# Patient Record
Sex: MALE | Race: WHITE | NOT HISPANIC OR LATINO | Employment: FULL TIME | URBAN - METROPOLITAN AREA
[De-identification: names, ages, dates, MRNs, and addresses within clinical notes are randomized per-mention and may not be internally consistent; named-entity substitution may affect disease eponyms.]

---

## 2022-04-16 ENCOUNTER — APPOINTMENT (OUTPATIENT)
Dept: ULTRASOUND IMAGING | Facility: CLINIC | Age: 42
End: 2022-04-16
Attending: EMERGENCY MEDICINE
Payer: OTHER GOVERNMENT

## 2022-04-16 ENCOUNTER — HOSPITAL ENCOUNTER (EMERGENCY)
Facility: CLINIC | Age: 42
Discharge: HOME | End: 2022-04-16
Attending: EMERGENCY MEDICINE | Admitting: EMERGENCY MEDICINE
Payer: OTHER GOVERNMENT

## 2022-04-16 VITALS
TEMPERATURE: 98 F | HEART RATE: 64 BPM | OXYGEN SATURATION: 99 % | RESPIRATION RATE: 26 BRPM | DIASTOLIC BLOOD PRESSURE: 71 MMHG | SYSTOLIC BLOOD PRESSURE: 129 MMHG | WEIGHT: 268 LBS

## 2022-04-16 DIAGNOSIS — M79.89 PAIN AND SWELLING OF RIGHT LOWER LEG: ICD-10-CM

## 2022-04-16 DIAGNOSIS — M79.661 PAIN AND SWELLING OF RIGHT LOWER LEG: ICD-10-CM

## 2022-04-16 DIAGNOSIS — Z96.651 S/P TOTAL KNEE ARTHROPLASTY, RIGHT: ICD-10-CM

## 2022-04-16 PROCEDURE — 93971 EXTREMITY STUDY: CPT | Mod: RT

## 2022-04-16 PROCEDURE — 99284 EMERGENCY DEPT VISIT MOD MDM: CPT | Mod: 25

## 2022-04-16 PROCEDURE — 250N000013 HC RX MED GY IP 250 OP 250 PS 637: Performed by: EMERGENCY MEDICINE

## 2022-04-16 RX ORDER — OXYCODONE HYDROCHLORIDE 5 MG/1
5 TABLET ORAL ONCE
Status: COMPLETED | OUTPATIENT
Start: 2022-04-16 | End: 2022-04-16

## 2022-04-16 RX ADMIN — OXYCODONE HYDROCHLORIDE 5 MG: 5 TABLET ORAL at 18:15

## 2022-04-16 NOTE — ED TRIAGE NOTES
Pt presents to ED with right calf and thigh pain. States he had a TKA at [place] on Monday. Pain started yesterday and states his leg is increasing swollen and bruised. Denies chest pain and shortness of breath.

## 2022-04-16 NOTE — DISCHARGE INSTRUCTIONS
Please return to the ER if you develop fever, blue/discolored toes, numbness/tingling of the leg or any new or worsening symptom.      Follow up with your orthopedic doctor regarding your visit in the next week.

## 2022-04-16 NOTE — ED PROVIDER NOTES
"  History   Chief Complaint:  No chief complaint on file.       HPI   Ivan Streeter is a 41 year old male with history of right TKA 4/11 with Dr. Youngblood at [facility] presenting to the ER for evaluation of right calf pain and right leg swelling and bruising.  Patient reports onset of bruising the day after which has since progressed to the lower half of his leg.  He also reports increased swelling.  Yesterday, he had onset of \"charley horse\" pain to the right calf that is failed to resolve.  He has not felt pain like this before.  He denies any numbness or tingling to the leg.  He denies history of DVT.  Denies chest pain, shortness of breath, pain with breathing.  Due to the pain and recent surgery, patient presents for evaluation.  Denies fever or chills.      Review of Systems   Constitutional: Negative for chills and fever.   Respiratory: Negative for shortness of breath.    Cardiovascular: Negative for chest pain.   Musculoskeletal:        Left calf pain, left leg swelling and bruising   All other systems reviewed and are negative.        Allergies:  No Known Allergies    Medications:  No current outpatient medications on file.      Past Medical History:     Past Medical History:   Diagnosis Date     Hyperlipidemia      There are no problems to display for this patient.       Past Surgical History:    Past Surgical History:   Procedure Laterality Date     TOTAL KNEE ARTHROPLASTY Right         Family History:    No family history on file.    Social History:  The patient presents to the ED alone.    Physical Exam     Patient Vitals for the past 24 hrs:   BP Temp Temp src Pulse Resp SpO2 Weight   04/16/22 1845 129/71 -- -- 64 -- 99 % --   04/16/22 1706 -- 98  F (36.7  C) Oral -- -- -- --   04/16/22 1705 -- -- -- -- 26 -- 121.6 kg (268 lb)   04/16/22 1704 (!) 174/78 -- -- 88 -- 98 % --       Physical Exam  General: the patient is awake and interactive  HEENT:  Moist mucous membranes, conjunctiva " normal  Pulmonary:  Normal respiratory effort  Cardiovascular:  Well perfused  Musculoskeletal:  Moving 4 extremities grossly wnl, no deformities  Right leg:  Right leg swelling compared to left, 1+ edema throughout.  RLE compartments are soft in the upper and lower leg.  Bruising noted to the dependent areas of the leg.  DP/PT pulse 2+.  SILT throughout the leg.  Right anterior incision covered with bandage; proximal and distal portion of bandage partially removed - incision appears c/d/i  Neuro:  Speech normal, no focal deficits  Psych:  Normal affect                  Emergency Department Course       Imaging:  US Lower Extremity Venous Duplex Right   Final Result   IMPRESSION:   1.  No deep venous thrombosis in the right lower extremity.        Report per radiology.     Laboratory:  Labs Ordered and Resulted from Time of ED Arrival to Time of ED Departure - No data to display    Procedures  None    Emergency Department Course:      Reviewed:  I reviewed nursing notes, vitals, and past medical history    Assessments:   I performed a physical exam of the patient. Findings as above.      Patient rechecked and updated. Plan of care discussed and questions answered.     Consults:   None      Interventions:  Medications   oxyCODONE (ROXICODONE) tablet 5 mg (5 mg Oral Given 4/16/22 1815)       Disposition:  The patient was discharged to home.     Impression & Plan       Covid-19  Ivan Streeter was evaluated during a global COVID-19 pandemic, which necessitated consideration that the patient might be at risk for infection with the SARS-CoV-2 virus that causes COVID-19.   Applicable protocols for evaluation were followed during the patient's care.     Medical Decision Making:  Ivan Streeter is a 41 year old male who had recent history of TKA at [facility] 4/11 who presents to the ER for evaluation of right calf pain as well as right lower extremity swelling.  Please see below for details of HPI and exam.   He is afebrile and vitally stable.  Exam noted above with swelling throughout the leg in addition to bruising to the dependent areas.  He is otherwise CMS intact to the extremity.  No signs of compartment syndrome or arterial insufficiency.  No signs of cellulitis.  I did consider DVT but ultrasound of the lower extremity is negative.  Suspect pain secondary to postoperative edema.  Patient declined further pain medications and states that he has enough for the weekend.  I feel he is safe to discharge home to follow-up with his orthopedic surgeon.  Discussed RICE, Ace wrap for compression.  Discussed signs symptoms that should prompt his urgent return to the ER including symptoms and signs of compartment syndrome.  Patient is comfortable with this plan.  All questions were answered prior to discharge    Diagnosis:    ICD-10-CM    1. Pain and swelling of right lower leg  M79.661     M79.89    2. S/P total knee arthroplasty, right  Z96.651        Discharge Medications:  There are no discharge medications for this patient.      Scribe Disclosure:  Chion GARCIA MD, am serving as a scribe at 5:22 PM on 4/16/2022 to document services personally performed by Chino Stark MD based on my observations and the provider's statements to me.     [facility]         Chino Stark MD  04/17/22 0112

## 2022-04-17 ASSESSMENT — ENCOUNTER SYMPTOMS
CHILLS: 0
SHORTNESS OF BREATH: 0
FEVER: 0